# Patient Record
Sex: FEMALE | Race: BLACK OR AFRICAN AMERICAN | NOT HISPANIC OR LATINO | URBAN - METROPOLITAN AREA
[De-identification: names, ages, dates, MRNs, and addresses within clinical notes are randomized per-mention and may not be internally consistent; named-entity substitution may affect disease eponyms.]

---

## 2024-03-25 ENCOUNTER — EMERGENCY (EMERGENCY)
Facility: HOSPITAL | Age: 69
LOS: 0 days | Discharge: HOME | End: 2024-03-25
Payer: MEDICARE

## 2024-03-25 VITALS
WEIGHT: 216.93 LBS | TEMPERATURE: 98 F | HEIGHT: 65 IN | RESPIRATION RATE: 18 BRPM | HEART RATE: 68 BPM | DIASTOLIC BLOOD PRESSURE: 83 MMHG | SYSTOLIC BLOOD PRESSURE: 150 MMHG

## 2024-03-25 VITALS
HEART RATE: 66 BPM | RESPIRATION RATE: 17 BRPM | SYSTOLIC BLOOD PRESSURE: 145 MMHG | OXYGEN SATURATION: 98 % | TEMPERATURE: 98 F | DIASTOLIC BLOOD PRESSURE: 82 MMHG

## 2024-03-25 DIAGNOSIS — M54.50 LOW BACK PAIN, UNSPECIFIED: ICD-10-CM

## 2024-03-25 DIAGNOSIS — E11.9 TYPE 2 DIABETES MELLITUS WITHOUT COMPLICATIONS: ICD-10-CM

## 2024-03-25 DIAGNOSIS — I10 ESSENTIAL (PRIMARY) HYPERTENSION: ICD-10-CM
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
## 2024-03-25 DIAGNOSIS — E78.5 HYPERLIPIDEMIA, UNSPECIFIED: ICD-10-CM

## 2024-03-25 DIAGNOSIS — Z87.891 PERSONAL HISTORY OF NICOTINE DEPENDENCE: ICD-10-CM

## 2024-03-25 PROCEDURE — 99284 EMERGENCY DEPT VISIT MOD MDM: CPT

## 2024-03-25 RX ORDER — LIDOCAINE 4 G/100G
1 CREAM TOPICAL
Qty: 1 | Refills: 0
Start: 2024-03-25 | End: 2024-03-29

## 2024-03-25 RX ORDER — DIAZEPAM 5 MG
5 TABLET ORAL ONCE
Refills: 0 | Status: DISCONTINUED | OUTPATIENT
Start: 2024-03-25 | End: 2024-03-25

## 2024-03-25 RX ORDER — METHOCARBAMOL 500 MG/1
2 TABLET, FILM COATED ORAL
Qty: 18 | Refills: 0
Start: 2024-03-25 | End: 2024-03-27

## 2024-03-25 RX ORDER — IBUPROFEN 200 MG
1 TABLET ORAL
Qty: 15 | Refills: 0
Start: 2024-03-25 | End: 2024-03-29

## 2024-03-25 RX ORDER — METHOCARBAMOL 500 MG/1
750 TABLET, FILM COATED ORAL ONCE
Refills: 0 | Status: COMPLETED | OUTPATIENT
Start: 2024-03-25 | End: 2024-03-25

## 2024-03-25 RX ORDER — IBUPROFEN 200 MG
600 TABLET ORAL ONCE
Refills: 0 | Status: COMPLETED | OUTPATIENT
Start: 2024-03-25 | End: 2024-03-25

## 2024-03-25 RX ORDER — LIDOCAINE 4 G/100G
1 CREAM TOPICAL ONCE
Refills: 0 | Status: COMPLETED | OUTPATIENT
Start: 2024-03-25 | End: 2024-03-25

## 2024-03-25 RX ADMIN — Medication 600 MILLIGRAM(S): at 12:58

## 2024-03-25 RX ADMIN — METHOCARBAMOL 750 MILLIGRAM(S): 500 TABLET, FILM COATED ORAL at 12:02

## 2024-03-25 RX ADMIN — Medication 5 MILLIGRAM(S): at 13:13

## 2024-03-25 RX ADMIN — LIDOCAINE 1 PATCH: 4 CREAM TOPICAL at 12:02

## 2024-03-25 RX ADMIN — Medication 600 MILLIGRAM(S): at 12:02

## 2024-03-25 RX ADMIN — LIDOCAINE 1 PATCH: 4 CREAM TOPICAL at 13:11

## 2024-03-25 NOTE — ED PROVIDER NOTE - CARE PLAN
1 Principal Discharge DX:	Lower back pain  Assessment and plan of treatment:	Patient currently afebrile, hemodynamically stable, spO2 100%. Physical exam reassuring with no focal neuro deficits. No red flag signs including saddle anesthesia or urinary/bowel incontience. Normal gait. +reproducible tenderness to palpation in paraspinal lumbar regions. Based on history and physical, dx most consistent with muscle spasm/strain. Low suspicion for cord compression or cauda equina syndrome. No labs or imaging indicated. Will administer medications for symptomatic relief, follow-up on results, and reassess. If improvement in pain, will likely d/c home.   Principal Discharge DX:	Lower back pain  Assessment and plan of treatment:	Patient currently afebrile, hemodynamically stable, spO2 100%. Physical exam reassuring with no focal neuro deficits. No red flag signs including saddle anesthesia or urinary/bowel incontinence. Normal gait. +reproducible tenderness to palpation in paraspinal lumbar regions. Based on history and physical, dx most consistent with muscle spasm/strain. Low suspicion for cord compression or cauda equina syndrome. No labs or imaging indicated. Will administer medications for symptomatic relief, follow-up on results, and reassess. If improvement in pain, will likely d/c home.

## 2024-03-25 NOTE — ED PROVIDER NOTE - OBJECTIVE STATEMENT
67 y/o F with pmhx of HTN, HLD, DMT2 BIB EMS from work c/o lower back pain since yesterday, worse this morning. Pt states pain started after she lifted a heavy box yesterday. Pt took tylenol x 2 last night with subsequent relief in pain. This morning work up with worse pain exacerbated with movement. Pt last took tylenol about 3 hours prior to arrival to the ED with some relief in pain. Denies fever/chills, extremity paresthesias/weakness, urinary/bowel incontinence, dysuria, saddle anesthesia, hx of spinal injections or IVDA, unintentional weight loss, recent fall. NKDA. Former smoker. Social etoh use. No other recreational drug use.

## 2024-03-25 NOTE — ED PROVIDER NOTE - NSFOLLOWUPINSTRUCTIONS_ED_ALL_ED_FT
You were seen in the ED for back pain.  Your pain is likely related to muscle spasms.     For pain:  1. Take Tylenol 650mg (Two 325 mg pills) every 4-6 hours or two 500mg extra strength Tylenol tablets every 8 hours as needed for pain or fevers.  2. Take Motrin (also sold as Advil or Ibuprofen) 400-600 mg (two or three 200 mg over the counter pills) every 8 hours as needed for moderate pain or fevers-- take with food; do not take for more than 5 consecutive days.  3. Take one 750mg tablet of Robaxin every 8 hours as needed for pain. This medication may make you drowsy so DO NOT DRIVE OR OPERATE HEAVY MACHINERY WHILE TAKING THIS MEDICATION. Do not use alcohol while taking this medication.  4. Apply lidocaine patch to affected area; this is available over the counter, follow instructions on its packaging.   5. Apply heat packs to the affected areas for additional pain relief.    Advance activity as tolerated. Continue all previously prescribed medications as directed unless otherwise instructed. Follow up with your primary care physician in 48-72 hours- bring copies of your results.  Return to the ER for worsening or persistent symptoms, including but not limited to worsening/persistent pain, numbness, weakness, difficulty urinating, difficulty passing bowel movements, incontinence, difficulty walking, falls, abdominal pain, chest pain, fevers, and/or ANY NEW OR CONCERNING SYMPTOMS.       Back Pain  WHAT YOU NEED TO KNOW:  Back pain is common. It can be caused by many conditions, such as arthritis or the breakdown of spinal discs. Your risk for back pain is increased by injuries, lack of activity, or repeated bending and twisting. You may feel sore or stiff on one or both sides of your back. The pain may spread to your buttocks or thighs.  DISCHARGE INSTRUCTIONS:  Return to the emergency department if:   •You have pain, numbness, or weakness in one or both legs.  •Your pain becomes so severe that you cannot walk.  •You cannot control your urine or bowel movements.  •You have severe back pain with chest pain.  •You have severe back pain, nausea, and vomiting.  •You have severe back pain that spreads to your side or genital area.  Contact your healthcare provider if:   •You have back pain that does not get better with rest and pain medicine.  •You have a fever.  •You have pain that worsens when you are on your back or when you rest.  •You have pain that worsens when you cough or sneeze.  •You lose weight without trying.  •You have questions or concerns about your condition or care.  Medicines:   •NSAIDs help decrease swelling and pain. This medicine is available with or without a doctor's order. NSAIDs can cause stomach bleeding or kidney problems in certain people. If you take blood thinner medicine, always ask your healthcare provider if NSAIDs are safe for you. Always read the medicine label and follow directions.  •Acetaminophen decreases pain and fever. It is available without a doctor's order. Ask how much to take and how often to take it. Follow directions. Read the labels of all other medicines you are using to see if they also contain acetaminophen, or ask your doctor or pharmacist. Acetaminophen can cause liver damage if not taken correctly. Do not use more than 4 grams (4,000 milligrams) total of acetaminophen in one day.   •Muscle relaxers help decrease muscle spasms and back pain.  •Prescription pain medicine may be given. Ask your healthcare provider how to take this medicine safely. Some prescription pain medicines contain acetaminophen. Do not take other medicines that contain acetaminophen without talking to your healthcare provider. Too much acetaminophen may cause liver damage. Prescription pain medicine may cause constipation. Ask your healthcare provider how to prevent or treat constipation.   •Take your medicine as directed. Contact your healthcare provider if you think your medicine is not helping or if you have side effects. Tell him or her if you are allergic to any medicine. Keep a list of the medicines, vitamins, and herbs you take. Include the amounts, and when and why you take them. Bring the list or the pill bottles to follow-up visits. Carry your medicine list with you in case of an emergency.  How to manage your back pain:   •Apply ice on your back for 15 to 20 minutes every hour or as directed. Use an ice pack, or put crushed ice in a plastic bag. Cover it with a towel before you apply it to your skin. Ice helps prevent tissue damage and decreases pain.  •Apply heat on your back for 20 to 30 minutes every 2 hours for as many days as directed. Heat helps decrease pain and muscle spasms.  •Stay active as much as you can without causing more pain. Bed rest could make your back pain worse. Avoid heavy lifting until your pain is gone.  •Go to physical therapy as directed. A physical therapist can teach you exercises to help improve movement and strength, and to decrease pain.  Follow up with your healthcare provider in 2 weeks, or as directed: Write down your questions so you remember to ask them during your visits.

## 2024-03-25 NOTE — ED PROVIDER NOTE - PROGRESS NOTE DETAILS
Supervising Statement (KIRBY Conner): I have personally seen and examined this patient.  I have fully participated in the care of this patient. I have reviewed all pertinent clinical information, including history, physical exam, plan and the ACP Fellow's note and agree except as noted.    Patient appears well, in NAD, able to ambulate, dec ROM of back but bilateral paraspinal L > R tenderness without midline tenderness. No CVA tenderness, no urinary symptoms. No concern for cord compression, cauda equina or epidural abscess. Will assess after meds but plan to dc with meds, rest and ortho follow up if symptoms persist.

## 2024-03-25 NOTE — ED ADULT NURSE NOTE - OBJECTIVE STATEMENT
Pt BIBEMS A&Ox3 c/o lower mid back pain x 1 day. Pt reports lifting 2 heavy boxes yesterday after which pain began. Pt reports pain worsened this morning after sitting on toilet. Pt took two tylenols at approximately 9 am with partial relief. Hx of HLD, HTN,DM

## 2024-03-25 NOTE — ED PROVIDER NOTE - PHYSICAL EXAMINATION
General: Well appearing in no acute distress, alert and cooperative  Head: Normocephalic, atraumatic  Neck: Soft and supple, full ROM without pain, no midline tenderness  Cardiac: Regular rate and regular rhythm, no murmurs  Resp: Unlabored respiratory effort, lungs CTAB, speaking in full sentences, no wheezes  Abd: Soft, non-tender, non-distended, no guarding or rebound tenderness  MSK: Spine midline and non-tender with no step-offs. +paraspinal tenderness to palpation in lumbar region b/l.   Skin: Warm and dry, no rashes/abrasions/lacerations  Neuro: AO x 3, moves all extremities symmetrically, Motor strength 5/5 bilaterally UE and LE, sensation grossly intact. Normal gait.

## 2024-03-25 NOTE — ED PROVIDER NOTE - PLAN OF CARE
Patient currently afebrile, hemodynamically stable, spO2 100%. Physical exam reassuring with no focal neuro deficits. No red flag signs including saddle anesthesia or urinary/bowel incontience. Normal gait. +reproducible tenderness to palpation in paraspinal lumbar regions. Based on history and physical, dx most consistent with muscle spasm/strain. Low suspicion for cord compression or cauda equina syndrome. No labs or imaging indicated. Will administer medications for symptomatic relief, follow-up on results, and reassess. If improvement in pain, will likely d/c home. Patient currently afebrile, hemodynamically stable, spO2 100%. Physical exam reassuring with no focal neuro deficits. No red flag signs including saddle anesthesia or urinary/bowel incontinence. Normal gait. +reproducible tenderness to palpation in paraspinal lumbar regions. Based on history and physical, dx most consistent with muscle spasm/strain. Low suspicion for cord compression or cauda equina syndrome. No labs or imaging indicated. Will administer medications for symptomatic relief, follow-up on results, and reassess. If improvement in pain, will likely d/c home.

## 2024-03-25 NOTE — ED PROVIDER NOTE - CLINICAL SUMMARY MEDICAL DECISION MAKING FREE TEXT BOX
Patient currently afebrile, hemodynamically stable, spO2 100%. Physical exam reassuring with no focal neuro deficits. No red flag signs including saddle anesthesia or urinary/bowel incontinence. Normal gait. +reproducible tenderness to palpation in paraspinal lumbar regions. Based on history and physical, dx most consistent with muscle spasm/strain. Low suspicion for cord compression or cauda equina syndrome. No labs or imaging indicated. pt received ibuprofen, robaxin and lidocaine patch with little relief in pain. Pt then given Valium with subsequent reduction in pain, now 6/10 in severity (improved from initial 10/10). Will d/c home with additional pain control and strict return precautions.

## 2024-03-25 NOTE — ED ADULT TRIAGE NOTE - CHIEF COMPLAINT QUOTE
Patient BIB EMS from work with complaints of lower back pain, reports pain started yesterday after lifting a heavy box at home.

## 2024-03-25 NOTE — ED PROVIDER NOTE - PATIENT PORTAL LINK FT
You can access the FollowMyHealth Patient Portal offered by [facility] by registering at the following website: [email or web address]. By joining [facility]’s FollowMyHealth portal, you will also be able to view your health information using other applications (apps) compatible with our system.

## 2024-03-25 NOTE — ED ADULT NURSE NOTE - NSFALLRISKINTERV_ED_ALL_ED
Assistance OOB with selected safe patient handling equipment if applicable/Assistance with ambulation/Communicate fall risk and risk factors to all staff, patient, and family/Monitor gait and stability/Provide visual cue: yellow wristband, yellow gown, etc/Reinforce activity limits and safety measures with patient and family/Call bell, personal items and telephone in reach/Instruct patient to call for assistance before getting out of bed/chair/stretcher/Non-slip footwear applied when patient is off stretcher/[place] to call system/Physically safe environment - no spills, clutter or unnecessary equipment/Purposeful Proactive Rounding/Room/bathroom lighting operational, light cord in reach

## 2024-03-25 NOTE — ED ADULT NURSE NOTE - NSFALLOOBATTEMPT_ED_ALL_ED
Voicemail left with negative covid PCR test result. Pt to continue all dc instructions given at time of visit. To call [phone] with any questions or concerns.     
No